# Patient Record
Sex: FEMALE | Race: OTHER | HISPANIC OR LATINO | Employment: UNEMPLOYED | URBAN - METROPOLITAN AREA
[De-identification: names, ages, dates, MRNs, and addresses within clinical notes are randomized per-mention and may not be internally consistent; named-entity substitution may affect disease eponyms.]

---

## 2019-05-06 PROBLEM — N91.2 AMENORRHEA: Status: RESOLVED | Noted: 2019-05-06 | Resolved: 2019-05-06

## 2019-05-06 PROBLEM — E03.8 SUBCLINICAL HYPOTHYROIDISM: Status: RESOLVED | Noted: 2018-05-14 | Resolved: 2019-05-06

## 2019-05-06 PROBLEM — E03.8 SUBCLINICAL HYPOTHYROIDISM: Status: ACTIVE | Noted: 2018-05-14

## 2019-05-06 PROBLEM — N91.2 AMENORRHEA: Status: ACTIVE | Noted: 2019-05-06

## 2019-07-10 PROBLEM — E28.2 PCOS (POLYCYSTIC OVARIAN SYNDROME): Status: ACTIVE | Noted: 2019-07-10

## 2019-08-12 PROBLEM — E66.9 OBESITY (BMI 30-39.9): Status: ACTIVE | Noted: 2019-08-12

## 2021-04-29 PROBLEM — N91.5 OLIGOMENORRHEA: Status: ACTIVE | Noted: 2021-04-29

## 2021-05-12 ENCOUNTER — PATIENT MESSAGE (OUTPATIENT)
Dept: RESEARCH | Facility: HOSPITAL | Age: 21
End: 2021-05-12

## 2021-11-02 PROBLEM — N39.0 UTI (URINARY TRACT INFECTION): Status: ACTIVE | Noted: 2021-11-02

## 2021-11-02 PROBLEM — M54.2 NECK PAIN: Status: ACTIVE | Noted: 2021-11-02

## 2022-01-10 ENCOUNTER — SOCIAL WORK (OUTPATIENT)
Dept: ADMINISTRATIVE | Facility: OTHER | Age: 22
End: 2022-01-10

## 2022-01-10 NOTE — PROGRESS NOTES
SW completed pt's notification of pregnancy and faxed/scanned into epic. No other needs identified at this [email or web address]    Devora Bal,MSW  Pager#[phone]

## 2022-01-31 ENCOUNTER — PATIENT MESSAGE (OUTPATIENT)
Dept: ADMINISTRATIVE | Facility: OTHER | Age: 22
End: 2022-01-31

## 2022-01-31 PROBLEM — O09.01: Status: ACTIVE | Noted: 2022-01-31

## 2022-03-22 PROBLEM — M54.10 RADICULOPATHY: Status: ACTIVE | Noted: 2022-03-22

## 2022-03-22 PROBLEM — M79.602 PAIN OF LEFT UPPER EXTREMITY: Status: ACTIVE | Noted: 2022-03-22

## 2022-03-22 PROBLEM — R53.1 WEAKNESS: Status: ACTIVE | Noted: 2022-03-22

## 2024-02-07 PROBLEM — E03.8 SUBCLINICAL HYPOTHYROIDISM: Status: RESOLVED | Noted: 2018-05-14 | Resolved: 2024-02-07

## 2024-02-22 ENCOUNTER — PATIENT MESSAGE (OUTPATIENT)
Dept: ADMINISTRATIVE | Facility: HOSPITAL | Age: 24
End: 2024-02-22

## 2024-08-18 ENCOUNTER — PATIENT OUTREACH (OUTPATIENT)
Dept: ADMINISTRATIVE | Facility: HOSPITAL | Age: 24
End: 2024-08-18

## 2025-07-21 ENCOUNTER — PATIENT OUTREACH (OUTPATIENT)
Dept: ADMINISTRATIVE | Facility: HOSPITAL | Age: 25
End: 2025-07-21